# Patient Record
Sex: MALE | Race: WHITE
[De-identification: names, ages, dates, MRNs, and addresses within clinical notes are randomized per-mention and may not be internally consistent; named-entity substitution may affect disease eponyms.]

---

## 2019-01-25 ENCOUNTER — HOSPITAL ENCOUNTER (EMERGENCY)
Dept: HOSPITAL 44 - ED | Age: 59
LOS: 1 days | Discharge: HOME | End: 2019-01-26
Payer: COMMERCIAL

## 2019-01-25 DIAGNOSIS — T50.995A: ICD-10-CM

## 2019-01-25 DIAGNOSIS — K59.03: Primary | ICD-10-CM
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
## 2019-01-25 LAB
APPEARANCE UR: CLEAR
BASOPHILS NFR BLD: 0.8 % (ref 0–1.5)
COLOR,URINE: (no result)
EGFR (NON-AFRICAN): > 60
EOSINOPHIL NFR BLD: 1.7 % (ref 0–6.8)
MCH RBC QN AUTO: 29.4 PG (ref 28–34)
MCV RBC AUTO: 90 FL (ref 80–100)
MONOCYTES %: 9.2 % (ref 0–11)
NEUTROPHILS #: 10.8 # K/UL (ref 1.4–7.7)
PH UR STRIP: 6.5 [PH] (ref 5–8)
RBC UR QL: NEGATIVE
UROBILINOGEN URINE: 1 EU (ref 0.2–1)

## 2019-01-25 PROCEDURE — 99285 EMERGENCY DEPT VISIT HI MDM: CPT

## 2019-01-25 PROCEDURE — 80053 COMPREHEN METABOLIC PANEL: CPT

## 2019-01-25 PROCEDURE — 74018 RADEX ABDOMEN 1 VIEW: CPT

## 2019-01-25 PROCEDURE — S1016 NON-PVC INTRAVENOUS ADMINIST: HCPCS

## 2019-01-25 PROCEDURE — 83690 ASSAY OF LIPASE: CPT

## 2019-01-25 PROCEDURE — 96375 TX/PRO/DX INJ NEW DRUG ADDON: CPT

## 2019-01-25 PROCEDURE — 85379 FIBRIN DEGRADATION QUANT: CPT

## 2019-01-25 PROCEDURE — 36415 COLL VENOUS BLD VENIPUNCTURE: CPT

## 2019-01-25 PROCEDURE — 74177 CT ABD & PELVIS W/CONTRAST: CPT

## 2019-01-25 PROCEDURE — 99283 EMERGENCY DEPT VISIT LOW MDM: CPT

## 2019-01-25 PROCEDURE — 96374 THER/PROPH/DIAG INJ IV PUSH: CPT

## 2019-01-25 PROCEDURE — 81002 URINALYSIS NONAUTO W/O SCOPE: CPT

## 2019-01-25 PROCEDURE — 85025 COMPLETE CBC W/AUTO DIFF WBC: CPT

## 2019-01-25 NOTE — ED PHYSICIAN DOCUMENTATION
General Adult





- HISTORIAN


Historian: patient





- HPI


Stated Complaint: constipation after pain med regimen


Chief Complaint: General Adult


Onset: hours


Timing: still present


Severity: moderate


Further Comments: yes (Pt is a 57 yo male with c/o constipation.  Pt had been on

a pain med regimen and he attributes his constipation to using the pain med.  Pt

had surgical repair of a fracured R fibula after falling on stairs 2 weeks ago. 

Pt c/o generalized abd pain, but worse in lower abdomen.  No n/v.  Pt has not 

been eating or taking fluids well.  No bm x 4 days.  Pt has significant 

hemorrhoids and wife found some blood when she was about to give pt an enema.  

She stopped when she saw the blood, not knowing if it was ok to give the enema. 

Pt had been off oxycodone for more than a week, and had been taking tramadol 

until last week, prior to developing constipation.  Pt also has been having 

urinary retention.  Pt appears uncomfortable on presentation.)





- ROS


CONST: no problems


EYES/ENT: none


CVS/RESP: none


GI/: abdominal pain, problems urinating (retention), other (constipation)


MS/SKIN/LYMPH: other (recent leg surgery for L fibula fx)





- PAST HX


Past History: hypertension


Surgeries/Procedures: other (L fibula fx repair, 2 weeks ago)


Allergies/Adverse Reactions: 


                                    Allergies











Allergy/AdvReac Type Severity Reaction Status Date / Time


 


Penicillins Allergy   Verified 01/25/19 21:04














Home Medications: 


                                Ambulatory Orders











 Medication  Instructions  Recorded


 


Aspirin [Jack] 81 mg PO D 08/25/16


 


Benazepril HCl 20 mg PO D 08/25/16


 


Rosuvastatin Calcium [Crestor] 40 mg PO D 08/25/16


 


Chlorzoxazone [Parafon Forte Dsc] 500 mg PO QID #20 tablet 08/26/16


 


Meloxicam [Mobic] 7.5 mg PO BID #20 tablet 08/26/16














- SOCIAL HX


Smoking History: non-smoker





- FAMILY HX


Family History: No





- VITAL SIGNS


Vital Signs: 





                                   Vital Signs











Temp Pulse Resp BP Pulse Ox


 


          126/66    


 


          08/26/16 04:59   














- REVIEWED ASSESSMENTS


Nursing Assessment  Reviewed: Yes


Vitals Reviewed: Yes





Progress





- Progress


Progress: 


NS 1 L IVF x 2


Zofran 4 mg IV


Dilaudid 1 mg IV





KUB: Impression: Scattered air filled loops of large and small bowel - 

nonspecific bowel gas pattern. No obstruction.  Moderate large bowel stool.  No 

suspicious calcifications by plain film sensitivity.


 


CT abd/pelvis w IV contrast:  No evidence of acute abdominal or pelvic visceral 

process.  Rectum distended with stool.





ivy cath in & out cath


volume voided 1200 cc's








oil enema x 1


Fleets enema x 1


soap suds enema x 1








Pt had good bm.  Feeling better.








General Adult Physical Exam





- PHYSICAL EXAM


GENERAL APPEARANCE: moderate distress


EENT: pharynx normal


NECK: normal inspection, supple


RESPIRATORY: no resp distress, chest non-tender, breath sounds normal


CVS: reg rate & rhythm, heart sounds normal


ABDOMEN: soft, tenderness (generalized abd tenderness, but more intense lower 

abd), decreased BS


RECTAL: hemorrhoids


BACK: normal inspection, no CVA tenderness


SKIN: warm/dry, normal color


EXTREMITIES: non-tender, normal range of motion, no evidence of injury


NEURO: oriented X3, motor nml, sensation nml





Discharge


Clincal Impression: 


Constipation


Qualifiers:


 Constipation type: drug induced constipation Qualified Code(s): K59.03 - Drug 

induced constipation





Referrals: 


Deny Anand MD [Primary Care Provider] - 2 Days


Condition: Stable


Disposition: 01 HOME, SELF-CARE


Decision to Admit: NO


Decision Time: 02:40

## 2019-01-26 VITALS — SYSTOLIC BLOOD PRESSURE: 100 MMHG | DIASTOLIC BLOOD PRESSURE: 60 MMHG

## 2019-01-26 NOTE — DIAGNOSTIC IMAGING REPORT
VIPIN LEOS 

[facility]

[address] [facility] [address]

[place], [place]. [address]

 

 

 

 

Report Submission Date: 2019 9:31:12 PM CST

Patient       Study

Name:   FILIBERTO ARRINGTON       Date:   2019 8:38:08 PM CST

MRN:   [id number]       Modality Type:   CT\SR

Gender:   M       Description:   CT ABD PELVIS W/ CON

:   60       Institution:   [facility]

Physician:   VIPIN LEOS

     Accession:    [id number]

 

 

CT abdomen and pelvis with contrast 



Date of study: 2019. 



CLINICAL HISTORY:  ABD PAIN OUT OF PROPORTION TO EXAM (Hx) / ITS.REASON abd pain
out of proportion to exam 



TECHNIQUE: 

5 mm contiguous axial images of the abdomen and pelvis with IV contrast.   With;
93 CC OMNIPAQUE    



FINDINGS: 

No comparison studies are provided. Bibasilar atelectasis and dependent lung 
densities are present. 



Abdomen: The liver, pancreas and spleen are normal in appearance. The 
gallbladder is unremarkable. The kidneys enhance appropriately and 
symmetrically. A left renal cortical hypodensity measures up 21.2 cm and appears
most consistent with a cyst. The aorta is normal in caliber. There are scattered
small bowel air-fluid levels, however, the bowel is nondilated. There is no 
evidence of free air or free fluid. 



Pelvis: The colon is normal in appearance. The distal ureters and bladder are 
normal. Appendix is normal. There is no evidence of free air or free fluid. The 
rectum is distended with stool. The remaining pelvic structures are within 
normal limits and the bones of the pelvis are intact. 



IMPRESSION: 

No evidence of acute abdominal or pelvic visceral process. 



Rectum distended with stool.

 

Electronically signed on 2019 9:31:12 PM CST by:

Abhay RODRÍGUEZ

## 2019-01-26 NOTE — DIAGNOSTIC IMAGING REPORT
VIPIN LEOS 

[facility]

[address] [facility] [address] [place] [address]. [id number]

 

 

 

 

Report Submission Date: 2019 8:09:13 PM CST

Patient       Study

Name:   FILIBERTO ARRINGTON       Date:   2019 7:06:01 PM CST

MRN:   [id number]       Modality Type:   DX

Gender:   M       Description:   ABDOMEN 1 VIEW

:   60       Institution:   [facility]

Physician:   VIPIN LEOS

     Accession:    [id number]

 

 

Examination: Abdomen 



History: ABD PAIN, NO BM X4 DAYS 



Findings: 3 views obtained of the abdomen. No abnormal dilation of the large or 
small bowel. Air and stool throughout the large bowel. No suspicious 
calcification projecting over the renal fossa or the lower pelvic region.  
Osseous structures are appropriate for age. 



Impression: Scattered air filled loops of large and small bowel - nonspecific 
bowel gas pattern. No obstruction. 



Moderate large bowel stool. 



No suspicious calcifications by plain film sensitivity.

 

Electronically signed on 2019 8:09:13 PM CST by:

Ezra RODRÍGUEZ